# Patient Record
Sex: FEMALE | Race: NATIVE HAWAIIAN OR OTHER PACIFIC ISLANDER | NOT HISPANIC OR LATINO | URBAN - METROPOLITAN AREA
[De-identification: names, ages, dates, MRNs, and addresses within clinical notes are randomized per-mention and may not be internally consistent; named-entity substitution may affect disease eponyms.]

---

## 2017-11-29 ENCOUNTER — APPOINTMENT (OUTPATIENT)
Age: 27
Setting detail: DERMATOLOGY
End: 2017-11-30

## 2017-11-29 DIAGNOSIS — L73.2 HIDRADENITIS SUPPURATIVA: ICD-10-CM

## 2017-11-29 DIAGNOSIS — L90.5 SCAR CONDITIONS AND FIBROSIS OF SKIN: ICD-10-CM

## 2017-11-29 PROCEDURE — OTHER TREATMENT REGIMEN: OTHER

## 2017-11-29 PROCEDURE — OTHER ORDER TESTS: OTHER

## 2017-11-29 PROCEDURE — OTHER COUNSELING: OTHER

## 2017-11-29 PROCEDURE — OTHER PRESCRIPTION: OTHER

## 2017-11-29 PROCEDURE — 99203 OFFICE O/P NEW LOW 30 MIN: CPT

## 2017-11-29 PROCEDURE — OTHER MIPS QUALITY: OTHER

## 2017-11-29 RX ORDER — CLINDAMYCIN PHOSPHATE 10 MG/ML
LOTION TOPICAL
Qty: 1 | Refills: 6 | Status: ERX | COMMUNITY
Start: 2017-11-29

## 2017-11-29 RX ORDER — DOXYCYCLINE 100 MG/1
CAPSULE ORAL
Qty: 60 | Refills: 2 | Status: ERX | COMMUNITY
Start: 2017-11-29

## 2017-11-29 ASSESSMENT — LOCATION SIMPLE DESCRIPTION DERM
LOCATION SIMPLE: RIGHT BREAST
LOCATION SIMPLE: LEFT AXILLARY VAULT
LOCATION SIMPLE: LEFT SHOULDER
LOCATION SIMPLE: RIGHT AXILLARY VAULT
LOCATION SIMPLE: LEFT THIGH
LOCATION SIMPLE: RIGHT THIGH

## 2017-11-29 ASSESSMENT — LOCATION DETAILED DESCRIPTION DERM
LOCATION DETAILED: LEFT AXILLARY VAULT
LOCATION DETAILED: LEFT ANTERIOR SHOULDER
LOCATION DETAILED: LEFT ANTERIOR PROXIMAL THIGH
LOCATION DETAILED: RIGHT AXILLARY TAIL OF BREAST
LOCATION DETAILED: RIGHT ANTERIOR PROXIMAL THIGH
LOCATION DETAILED: RIGHT AXILLARY VAULT

## 2017-11-29 ASSESSMENT — LOCATION ZONE DERM
LOCATION ZONE: AXILLAE
LOCATION ZONE: ARM
LOCATION ZONE: LEG
LOCATION ZONE: TRUNK

## 2017-11-29 NOTE — PROCEDURE: MIPS QUALITY
Quality 226: Preventive Care And Screening: Tobacco Use: Screening And Cessation Intervention: Patient screened for tobacco and never smoked
Detail Level: Zone
Quality 431: Preventive Care And Screening: Unhealthy Alcohol Use - Screening: Patient screened for unhealthy alcohol use using a single question and scores 2 or greater episodes per year and brief intervention occurred
Quality 111:Pneumonia Vaccination Status For Older Adults: Pneumococcal Vaccination Previously Received

## 2017-11-29 NOTE — HPI: CYST
How Severe Is Your Cyst?: moderate
Is This A New Presentation, Or A Follow-Up?: Cysts
Additional History: Pt states she was applying regular neosporin and polysporin

## 2017-11-29 NOTE — PROCEDURE: TREATMENT REGIMEN
Plan: Patient state this has gotten progressively worse and the condition only started about 6 months ago. She has extensive involvement which weeping nodules and sinus tract formation. Her PCP gave her a Prednisone taper in October and it has gotten worse since then. She has no family history of Hidradenitis Suppurtiva. Lemuel have a family history positive for DM (her sister) and negative for Inflammatory Bowel Disease (Crohn's and Ulcerative Colitis).\\nI instructed her to see a GI Specialist for consult and workup to rule out Inflammatory Bowel Disease. She will need to get a referral from her PCP. Since there is a  family history of DM she should have some blood work with her PCP.\\n\\nIf she cannot get under control and the GI workup is normal she would be a candidate for Humira given the extent of her disease
Detail Level: Simple
Initiate Treatment: Wash affected areas BID with OTC HIbiclens.\\nDo warm compresses to affected areas BID for 5-10 mins. \\nApply Clindamycin lotion 1% BID\\nTake Doxycycline 100mg one po BID

## 2017-12-20 ENCOUNTER — APPOINTMENT (OUTPATIENT)
Age: 27
Setting detail: DERMATOLOGY
End: 2017-12-20

## 2017-12-20 DIAGNOSIS — L73.2 HIDRADENITIS SUPPURATIVA: ICD-10-CM

## 2017-12-20 PROCEDURE — OTHER PRESCRIPTION: OTHER

## 2017-12-20 PROCEDURE — OTHER TREATMENT REGIMEN: OTHER

## 2017-12-20 PROCEDURE — OTHER COUNSELING: OTHER

## 2017-12-20 PROCEDURE — 99213 OFFICE O/P EST LOW 20 MIN: CPT

## 2017-12-20 RX ORDER — DOXYCYCLINE 100 MG/1
CAPSULE ORAL
Qty: 60 | Refills: 2 | Status: ERX

## 2017-12-20 ASSESSMENT — LOCATION ZONE DERM: LOCATION ZONE: AXILLAE

## 2017-12-20 ASSESSMENT — LOCATION SIMPLE DESCRIPTION DERM
LOCATION SIMPLE: LEFT AXILLARY VAULT
LOCATION SIMPLE: RIGHT AXILLARY VAULT

## 2017-12-20 ASSESSMENT — LOCATION DETAILED DESCRIPTION DERM
LOCATION DETAILED: RIGHT AXILLARY VAULT
LOCATION DETAILED: LEFT AXILLARY VAULT

## 2017-12-20 NOTE — PROCEDURE: TREATMENT REGIMEN
Plan: Pt stated that she been trying to call her PCP office to get a referral to see a GI specialist but they have not gotten back to her. Pt states she had blood work done and everything came back WNL. I advised the patient that I could send office notes to PCP again with my recommendation for her to see GI as soon as possible.If she does see GI and the workup is WNL then we can try to see if her insurance will cover Humira. The patient is having to tape gauze to her axilla daily to collect the drainage from her axillary sinus tracts lesions.\\n\\nAffected area was cleansed with Hibiclens and placed 4x4 with paper tape by my ANA Morrison
Continue Regimen: Warm compresses as needed,apply Clindamycin 1% lotion apply twice daily to affected areas, and continue the Doxycycline 100mg one po BID
Detail Level: Simple